# Patient Record
Sex: FEMALE | Race: WHITE | NOT HISPANIC OR LATINO | Employment: OTHER | RURAL
[De-identification: names, ages, dates, MRNs, and addresses within clinical notes are randomized per-mention and may not be internally consistent; named-entity substitution may affect disease eponyms.]

---

## 2022-12-09 ENCOUNTER — HOSPITAL ENCOUNTER (OUTPATIENT)
Dept: RADIOLOGY | Facility: HOSPITAL | Age: 67
Discharge: HOME | End: 2022-12-09
Attending: NURSE PRACTITIONER
Payer: MEDICARE

## 2022-12-09 DIAGNOSIS — M79.672 LEFT FOOT PAIN: ICD-10-CM

## 2022-12-09 PROCEDURE — 73630 X-RAY EXAM OF FOOT: CPT | Mod: TC,LT

## 2024-02-02 ENCOUNTER — HOSPITAL ENCOUNTER (EMERGENCY)
Facility: HOSPITAL | Age: 69
Discharge: HOME | End: 2024-02-02
Payer: MEDICARE

## 2024-02-02 VITALS
HEIGHT: 66 IN | DIASTOLIC BLOOD PRESSURE: 39 MMHG | WEIGHT: 147 LBS | HEART RATE: 78 BPM | RESPIRATION RATE: 18 BRPM | OXYGEN SATURATION: 97 % | BODY MASS INDEX: 23.63 KG/M2 | SYSTOLIC BLOOD PRESSURE: 123 MMHG | TEMPERATURE: 98 F

## 2024-02-02 DIAGNOSIS — M79.671 RIGHT FOOT PAIN: ICD-10-CM

## 2024-02-02 DIAGNOSIS — S99.921A INJURY OF RIGHT FOOT: Primary | ICD-10-CM

## 2024-02-02 PROCEDURE — 99283 EMERGENCY DEPT VISIT LOW MDM: CPT

## 2024-02-02 PROCEDURE — 99284 EMERGENCY DEPT VISIT MOD MDM: CPT | Mod: GF | Performed by: NURSE PRACTITIONER

## 2024-02-02 RX ORDER — PROPRANOLOL HYDROCHLORIDE 80 MG/1
80 CAPSULE, EXTENDED RELEASE ORAL
COMMUNITY
Start: 2024-01-02

## 2024-02-02 RX ORDER — ASPIRIN 325 MG
50000 TABLET, DELAYED RELEASE (ENTERIC COATED) ORAL
COMMUNITY
Start: 2024-01-09

## 2024-02-02 RX ORDER — OMEPRAZOLE 40 MG/1
40 CAPSULE, DELAYED RELEASE ORAL EVERY MORNING
COMMUNITY
Start: 2024-01-04

## 2024-02-02 RX ORDER — CLOPIDOGREL BISULFATE 75 MG/1
75 TABLET ORAL
COMMUNITY
Start: 2023-12-29

## 2024-02-02 RX ORDER — FAMOTIDINE 20 MG/1
20 TABLET, FILM COATED ORAL NIGHTLY PRN
COMMUNITY

## 2024-02-02 RX ORDER — ATORVASTATIN CALCIUM 40 MG/1
40 TABLET, FILM COATED ORAL NIGHTLY
COMMUNITY
Start: 2024-01-04

## 2024-02-02 RX ORDER — HYDROCODONE BITARTRATE AND ACETAMINOPHEN 10; 325 MG/1; MG/1
1 TABLET ORAL 2 TIMES DAILY PRN
COMMUNITY

## 2024-02-02 NOTE — ED PROVIDER NOTES
Encounter Date: 2/2/2024       History     Chief Complaint   Patient presents with    Foot Injury     right     67 y/o WF presents pov with c/o right foot pain after falling 6 days ago.Patient states she stooped forward and grabbed a rail. The rail gave way and patient fell hyperflexing her right forefoot. Patient rates pain 8/10. Refuses pain medication. States she goes to a pain clinic and took pain med just PTA to ED.        Review of patient's allergies indicates:  No Known Allergies  Past Medical History:   Diagnosis Date    GERD (gastroesophageal reflux disease)     HLD (hyperlipidemia)     Hypertension     Meniere's disease, unspecified ear      Past Surgical History:   Procedure Laterality Date    BACK SURGERY      HYSTERECTOMY      JOINT REPLACEMENT       History reviewed. No pertinent family history.  Social History     Tobacco Use    Smoking status: Every Day     Current packs/day: 1.00     Types: Cigarettes    Smokeless tobacco: Never   Substance Use Topics    Alcohol use: Never     Review of Systems   Constitutional:  Negative for chills and fever.   Respiratory: Negative.  Negative for apnea, cough, choking, chest tightness, shortness of breath, wheezing and stridor.    Cardiovascular: Negative.  Negative for chest pain, palpitations and leg swelling.   Musculoskeletal:  Positive for arthralgias.        Right forefoot pain   Skin: Negative.    Neurological: Negative.    Psychiatric/Behavioral: Negative.     All other systems reviewed and are negative.      Physical Exam     Initial Vitals [02/02/24 1449]   BP Pulse Resp Temp SpO2   (!) 123/39 78 18 97.9 °F (36.6 °C) 97 %      MAP       --         Physical Exam    Nursing note and vitals reviewed.  Constitutional: She appears well-developed and well-nourished. No distress.   Cardiovascular:  Normal rate, regular rhythm, normal heart sounds and intact distal pulses.     Exam reveals no gallop and no friction rub.       No murmur heard.  Pulmonary/Chest:  Breath sounds normal. No respiratory distress. She has no wheezes. She has no rhonchi. She has no rales. She exhibits no tenderness.   Musculoskeletal:      Right foot: Swelling, tenderness and bony tenderness present.        Legs:      Neurological: She is alert and oriented to person, place, and time. She has normal strength. No sensory deficit.   Skin: Skin is warm and dry. Capillary refill takes less than 2 seconds.   Psychiatric: She has a normal mood and affect. Her behavior is normal. Judgment and thought content normal.         Medical Screening Exam   See Full Note    ED Course   Procedures  Labs Reviewed - No data to display       Imaging Results              X-Ray Foot Complete Right (Final result)  Result time 02/02/24 15:28:08      Final result by Devon Cameron MD (02/02/24 15:28:08)                   Impression:      No acute findings.      Electronically signed by: Devon Cameron  Date:    02/02/2024  Time:    15:28               Narrative:    EXAMINATION:  XR FOOT COMPLETE 3 VIEW RIGHT    CLINICAL HISTORY:  . Unspecified injury of right foot, initial encounter    TECHNIQUE:  AP, lateral, and oblique views of the right foot were performed.    COMPARISON:  None    FINDINGS:  No acute fracture identified.  No dislocation.  Mild diffuse degenerative change.                                       Medications - No data to display  Medical Decision Making  67 y/o WF presents pov with c/o right foot pain after falling 6 days ago.Patient states she stooped forward and grabbed a rail. The rail gave way and patient fell hyperflexing her right forefoot. Patient rates pain 8/10. Refuses pain medication. States she goes to a pain clinic and took pain med just PTA to ED.      Amount and/or Complexity of Data Reviewed  Radiology: ordered.     Details: XR Right Foot: No acute findings.               ED Course as of 02/02/24 1547   Fri Feb 02, 2024   1533 XR Right Foot: No acute findings. [N[place]]      ED Course User  Index  [[place]] Martin Breen FNP                           Clinical Impression:   Final diagnoses:  [S99.921A] Injury of right foot (Primary)  [M79.671] Right foot pain        ED Disposition Condition    Discharge Stable          ED Prescriptions    None       Follow-up Information       Follow up With Specialties Details Why Contact Info    Anton Morgan NP Family Medicine Go to  As needed, for follow up [address] [place] [address] [place] [address]  [phone]               Martin Breen FNP  02/02/24 1538       Martin Breen FNP  02/02/24 1547

## 2024-05-28 ENCOUNTER — LAB VISIT (OUTPATIENT)
Dept: LAB | Facility: HOSPITAL | Age: 69
End: 2024-05-28
Attending: INTERNAL MEDICINE
Payer: MEDICARE

## 2024-05-28 DIAGNOSIS — R19.7 DIARRHEA, UNSPECIFIED TYPE: Primary | ICD-10-CM

## 2024-05-28 PROCEDURE — 87209 SMEAR COMPLEX STAIN: CPT

## 2024-05-28 PROCEDURE — 87427 SHIGA-LIKE TOXIN AG IA: CPT

## 2024-05-28 PROCEDURE — 89055 LEUKOCYTE ASSESSMENT FECAL: CPT

## 2024-05-28 PROCEDURE — 87045 FECES CULTURE AEROBIC BACT: CPT

## 2024-05-28 PROCEDURE — 87493 C DIFF AMPLIFIED PROBE: CPT

## 2024-05-28 PROCEDURE — 87046 STOOL CULTR AEROBIC BACT EA: CPT

## 2024-05-30 LAB — FECAL LEUKOCYTES: NORMAL /HPF

## 2024-05-31 LAB
CAMPYLOBACTER ANTIGEN: NEGATIVE
EHEC (SHIGA TOXIN 1): NEGATIVE
EHEC (SHIGA TOXIN 2): NEGATIVE

## 2024-06-01 LAB — SALM+SHIG+E COLI ETEC STL CULT: NORMAL

## 2024-06-02 LAB
O+P STL CONC: NORMAL
TRICHROME SMEAR: NORMAL

## 2024-06-06 LAB — C DIFF TOX A+B STL IA-ACNC: NEGATIVE

## 2024-11-18 ENCOUNTER — HOSPITAL ENCOUNTER (EMERGENCY)
Facility: HOSPITAL | Age: 69
Discharge: ANOTHER HOSPITAL | End: 2024-11-18
Payer: MEDICARE

## 2024-11-18 VITALS
HEART RATE: 77 BPM | WEIGHT: 145 LBS | DIASTOLIC BLOOD PRESSURE: 54 MMHG | TEMPERATURE: 98 F | OXYGEN SATURATION: 98 % | SYSTOLIC BLOOD PRESSURE: 101 MMHG | HEIGHT: 67 IN | RESPIRATION RATE: 19 BRPM | BODY MASS INDEX: 22.76 KG/M2

## 2024-11-18 DIAGNOSIS — N99.89: Primary | ICD-10-CM

## 2024-11-18 DIAGNOSIS — R44.3 HALLUCINATIONS: ICD-10-CM

## 2024-11-18 LAB
ALBUMIN SERPL BCP-MCNC: 2.4 G/DL (ref 3.4–4.8)
ALBUMIN/GLOB SERPL: 0.7 {RATIO}
ALP SERPL-CCNC: 144 U/L (ref 40–150)
ALT SERPL W P-5'-P-CCNC: <7 U/L (ref 0–55)
ANION GAP SERPL CALCULATED.3IONS-SCNC: 16 MMOL/L (ref 7–16)
AST SERPL W P-5'-P-CCNC: 37 U/L (ref 5–34)
BASOPHILS # BLD AUTO: 0.03 K/UL (ref 0–0.2)
BASOPHILS NFR BLD AUTO: 0.2 % (ref 0–1)
BILIRUB SERPL-MCNC: 1.8 MG/DL
BUN SERPL-MCNC: 25 MG/DL (ref 10–20)
BUN/CREAT SERPL: 22 (ref 6–20)
CALCIUM SERPL-MCNC: 9.2 MG/DL (ref 8.4–10.2)
CHLORIDE SERPL-SCNC: 97 MMOL/L (ref 98–107)
CO2 SERPL-SCNC: 25 MMOL/L (ref 23–31)
CREAT SERPL-MCNC: 1.16 MG/DL (ref 0.55–1.02)
DIFFERENTIAL METHOD BLD: ABNORMAL
EGFR (NO RACE VARIABLE) (RUSH/TITUS): 52 ML/MIN/1.73M2
EOSINOPHIL # BLD AUTO: 0.01 K/UL (ref 0–0.5)
EOSINOPHIL NFR BLD AUTO: 0.1 % (ref 1–4)
ERYTHROCYTE [DISTWIDTH] IN BLOOD BY AUTOMATED COUNT: 13.6 % (ref 11.5–14.5)
GLOBULIN SER-MCNC: 3.5 G/DL (ref 2–4)
GLUCOSE SERPL-MCNC: 119 MG/DL (ref 82–115)
HCT VFR BLD AUTO: 37.5 % (ref 38–47)
HGB BLD-MCNC: 12 G/DL (ref 12–16)
LYMPHOCYTES # BLD AUTO: 1.12 K/UL (ref 1–4.8)
LYMPHOCYTES NFR BLD AUTO: 7.9 % (ref 27–41)
MCH RBC QN AUTO: 31.2 PG (ref 27–31)
MCHC RBC AUTO-ENTMCNC: 32 G/DL (ref 32–36)
MCV RBC AUTO: 97.4 FL (ref 80–96)
MONOCYTES # BLD AUTO: 0.94 K/UL (ref 0–0.8)
MONOCYTES NFR BLD AUTO: 6.6 % (ref 2–6)
MPC BLD CALC-MCNC: 9.8 FL (ref 9.4–12.4)
NEUTROPHILS # BLD AUTO: 12.1 K/UL (ref 1.8–7.7)
NEUTROPHILS NFR BLD AUTO: 85.2 % (ref 53–65)
PLATELET # BLD AUTO: 139 K/UL (ref 150–400)
POTASSIUM SERPL-SCNC: 3.5 MMOL/L (ref 3.5–5.1)
PROT SERPL-MCNC: 5.9 G/DL (ref 5.8–7.6)
RBC # BLD AUTO: 3.85 M/UL (ref 4.2–5.4)
SODIUM SERPL-SCNC: 134 MMOL/L (ref 136–145)
WBC # BLD AUTO: 14.2 K/UL (ref 4.5–11)

## 2024-11-18 PROCEDURE — 99285 EMERGENCY DEPT VISIT HI MDM: CPT | Mod: 25

## 2024-11-18 PROCEDURE — 80053 COMPREHEN METABOLIC PANEL: CPT | Performed by: NURSE PRACTITIONER

## 2024-11-18 PROCEDURE — 85025 COMPLETE CBC W/AUTO DIFF WBC: CPT | Performed by: NURSE PRACTITIONER

## 2024-11-18 PROCEDURE — 63600175 PHARM REV CODE 636 W HCPCS: Performed by: NURSE PRACTITIONER

## 2024-11-18 PROCEDURE — 27000221 HC OXYGEN, UP TO 24 HOURS

## 2024-11-18 PROCEDURE — 25000003 PHARM REV CODE 250: Performed by: NURSE PRACTITIONER

## 2024-11-18 PROCEDURE — 99285 EMERGENCY DEPT VISIT HI MDM: CPT | Mod: EDII,,, | Performed by: NURSE PRACTITIONER

## 2024-11-18 PROCEDURE — 25500020 PHARM REV CODE 255: Performed by: NURSE PRACTITIONER

## 2024-11-18 PROCEDURE — 96361 HYDRATE IV INFUSION ADD-ON: CPT

## 2024-11-18 PROCEDURE — 96374 THER/PROPH/DIAG INJ IV PUSH: CPT

## 2024-11-18 PROCEDURE — 96375 TX/PRO/DX INJ NEW DRUG ADDON: CPT

## 2024-11-18 RX ORDER — CEPHALEXIN 500 MG/1
500 CAPSULE ORAL
COMMUNITY
Start: 2024-11-14 | End: 2024-11-21

## 2024-11-18 RX ORDER — CEFTRIAXONE 2 G/1
2 INJECTION, POWDER, FOR SOLUTION INTRAMUSCULAR; INTRAVENOUS
Status: COMPLETED | OUTPATIENT
Start: 2024-11-18 | End: 2024-11-18

## 2024-11-18 RX ORDER — HYDROCODONE BITARTRATE AND ACETAMINOPHEN 7.5; 325 MG/1; MG/1
1 TABLET ORAL
COMMUNITY
Start: 2024-11-14

## 2024-11-18 RX ORDER — ONDANSETRON HYDROCHLORIDE 2 MG/ML
4 INJECTION, SOLUTION INTRAVENOUS
Status: COMPLETED | OUTPATIENT
Start: 2024-11-18 | End: 2024-11-18

## 2024-11-18 RX ORDER — HYDROMORPHONE HYDROCHLORIDE 1 MG/ML
1 INJECTION, SOLUTION INTRAMUSCULAR; INTRAVENOUS; SUBCUTANEOUS
Status: COMPLETED | OUTPATIENT
Start: 2024-11-18 | End: 2024-11-18

## 2024-11-18 RX ORDER — IOPAMIDOL 755 MG/ML
100 INJECTION, SOLUTION INTRAVASCULAR
Status: COMPLETED | OUTPATIENT
Start: 2024-11-18 | End: 2024-11-18

## 2024-11-18 RX ORDER — TIOTROPIUM BROMIDE INHALATION SPRAY 3.12 UG/1
2 SPRAY, METERED RESPIRATORY (INHALATION)
COMMUNITY

## 2024-11-18 RX ADMIN — SODIUM CHLORIDE 1000 ML: 9 INJECTION, SOLUTION INTRAVENOUS at 04:11

## 2024-11-18 RX ADMIN — ONDANSETRON 4 MG: 2 INJECTION INTRAMUSCULAR; INTRAVENOUS at 01:11

## 2024-11-18 RX ADMIN — CEFTRIAXONE SODIUM 2 G: 2 INJECTION, POWDER, FOR SOLUTION INTRAMUSCULAR; INTRAVENOUS at 05:11

## 2024-11-18 RX ADMIN — HYDROMORPHONE HYDROCHLORIDE 1 MG: 1 INJECTION, SOLUTION INTRAMUSCULAR; INTRAVENOUS; SUBCUTANEOUS at 01:11

## 2024-11-18 RX ADMIN — IOPAMIDOL 100 ML: 755 INJECTION, SOLUTION INTRAVENOUS at 03:11

## 2024-11-18 NOTE — ED NOTES
"Pt asked "who are those little people on the wall?"   Pt reoriented to place and time. Re-assured their was no-one on the wall.   Pt states her  doesn't believe her but she knows her grandson is doing something with the flashing lights.     "

## 2024-11-18 NOTE — ED NOTES
Resting in bed. Denies needs at this time. Updated on wait, and plan of care. Will continue to monitor.

## 2024-11-18 NOTE — ED PROVIDER NOTES
"Encounter Date: 11/18/2024       History     Chief Complaint   Patient presents with    Hallucinations      states PTA patient was hallucinating seeing black and white lights and saying her grandson was making a mess in the house.  states the grandson was asleep in his room .     Pain     Pain since having surgery on Thursday, rates pain 10/10. " They gave me medicine and I already take some from pain management but I am in severe pain."   Pt has left nephrostomy tube secured with sutures and connected to rowe bag that is full of bright red blood. Pt states it has been like that since Thursday.      70 yo WF to ED with c/o generalized abdominal pain, abdominal distention since left percutaneous kidney stone removal on 11/14. Left nephrostomy tube noted with rowe containing bright red blood. No bleeding noted at incision site. Bandage in place. Spouse also states patient has been hallucinating, she states that it is not hallucinations, he just doesn't see it but grandson has a remote control toy in his room that is moving furniture around and making her see black and white lights. Patient was prescribed 7.5 mg Norco, she is also on 10 mg Norco per pain management. She states she has been alternating the doses and only taking 1/2 tab of each as needed but it is not helping with her pain. She is also on Keflex.     The history is provided by the patient and the spouse.     Review of patient's allergies indicates:   Allergen Reactions    Sulfa (sulfonamide antibiotics)      Past Medical History:   Diagnosis Date    GERD (gastroesophageal reflux disease)     HLD (hyperlipidemia)     Hypertension     Meniere's disease, unspecified ear      Past Surgical History:   Procedure Laterality Date    BACK SURGERY      HYSTERECTOMY      JOINT REPLACEMENT       No family history on file.  Social History     Tobacco Use    Smoking status: Every Day     Current packs/day: 1.00     Types: Cigarettes    Smokeless " tobacco: Never   Substance Use Topics    Alcohol use: Never    Drug use: Never     Review of Systems   Constitutional:  Positive for chills. Negative for fever.   Respiratory:  Negative for cough, shortness of breath and wheezing.    Cardiovascular:  Negative for chest pain, palpitations and leg swelling.   Gastrointestinal:  Positive for abdominal distention and abdominal pain. Negative for nausea and vomiting.   Genitourinary:  Positive for flank pain.   Musculoskeletal:  Negative for neck pain and neck stiffness.   Neurological:  Positive for weakness. Negative for dizziness and headaches.   Psychiatric/Behavioral:  Positive for hallucinations.    All other systems reviewed and are negative.      Physical Exam     Initial Vitals [11/18/24 1334]   BP Pulse Resp Temp SpO2   (!) 105/57 90 19 98.6 °F (37 °C) (!) 94 %      MAP       --         Physical Exam    Nursing note and vitals reviewed.  Constitutional: She appears well-developed and well-nourished.   HENT:   Head: Normocephalic and atraumatic.   Right Ear: External ear normal.   Left Ear: External ear normal.   Nose: Nose normal. Mouth/Throat: Oropharynx is clear and moist.   Eyes: EOM are normal. Pupils are equal, round, and reactive to light.   Neck: Neck supple.   Normal range of motion.  Cardiovascular:  Normal rate, regular rhythm and normal heart sounds.           Pulmonary/Chest: Breath sounds normal.   Abdominal: Abdomen is soft and protuberant. Bowel sounds are normal. She exhibits distension. There is abdominal tenderness.   Musculoskeletal:         General: Normal range of motion.      Cervical back: Normal range of motion and neck supple.     Neurological: She is alert and oriented to person, place, and time. She has normal strength. GCS score is 15. GCS eye subscore is 4. GCS verbal subscore is 5. GCS motor subscore is 6.   Skin: Skin is warm. Capillary refill takes less than 2 seconds.        Psychiatric: She has a normal mood and affect. Her  behavior is normal. Judgment and thought content normal. She is actively hallucinating.         Medical Screening Exam   See Full Note    ED Course   Procedures  Labs Reviewed   COMPREHENSIVE METABOLIC PANEL - Abnormal       Result Value    Sodium 134 (*)     Potassium 3.5      Chloride 97 (*)     CO2 25      Anion Gap 16      Glucose 119 (*)     BUN 25 (*)     Creatinine 1.16 (*)     BUN/Creatinine Ratio 22 (*)     Calcium 9.2      Total Protein 5.9      Albumin 2.4 (*)     Globulin 3.5      A/G Ratio 0.7      Bilirubin, Total 1.8 (*)     Alk Phos 144      ALT <7      AST 37 (*)     eGFR 52 (*)    CBC WITH DIFFERENTIAL - Abnormal    WBC 14.20 (*)     RBC 3.85 (*)     Hemoglobin 12.0      Hematocrit 37.5 (*)     MCV 97.4 (*)     MCH 31.2 (*)     MCHC 32.0      RDW 13.6      Platelet Count 139 (*)     MPV 9.8      Neutrophils % 85.2 (*)     Lymphocytes % 7.9 (*)     Neutrophils, Abs 12.10 (*)     Lymphocytes, Absolute 1.12      Diff Type Auto      Monocytes % 6.6 (*)     Eosinophils % 0.1 (*)     Basophils % 0.2      Monocytes, Absolute 0.94 (*)     Eosinophils, Absolute 0.01      Basophils, Absolute 0.03     CULTURE, BLOOD   CULTURE, BLOOD   CBC W/ AUTO DIFFERENTIAL    Narrative:     The following orders were created for panel order CBC auto differential.  Procedure                               Abnormality         Status                     ---------                               -----------         ------                     CBC with Differential[[id number]]       Abnormal            Final result               Manual Differential[[id number]]                                                          Please view results for these tests on the individual orders.   URINALYSIS, REFLEX TO URINE CULTURE          Imaging Results               CT Abdomen Pelvis With IV Contrast NO Oral Contrast (Final result)  Result time 11/18/24 16:26:34      Final result by Rd Granados MD (11/18/24 16:26:34)                    Impression:      1. There is the percutaneous catheter entering posteriorly on the left. The tip is not within the collecting system and is located adjacent to the lower pole of the left kidney.  Recommend clinical correlation.  There is an ill-defined perinephric fluid collection anteriorly adjacent to the renal pelvis extending to the left anterior perirenal space also.  Few foci of air are noted adjacent to the renal pelvis could be a postop change or related to a perforation of the renal pelvis. Early abscess formation is a consideration and follow-up is recommended.  There is moderate hydronephrosis and hydroureter on the left. Mild wall thickening the renal pelvis and left ureter could be a postop change. Inflammatory or infectious process is not excluded.  Follow-up recommended.  2. Hepatic steatosis.  3. Splenomegaly.  4. Atherosclerotic changes of the aorta with mild ectasia and possible high-grade narrowing of the infrarenal abdominal aorta with prominent calcific atherosclerosis.  See above comments.  Follow-up recommended.  5. Mild wall thickening and stranding of the anterior urinary bladder wall may be associated with cystitis.  See above comments.  Follow-up recommended.  6. This report was flagged in Epic as abnormal.      Electronically signed by: Rd Huff  Date:    11/18/2024  Time:    16:26               Narrative:    EXAMINATION:  CT ABDOMEN PELVIS WITH IV CONTRAST    CLINICAL HISTORY:  Abdominal pain, post-op;    TECHNIQUE:  Low dose axial images, sagittal and coronal reformations were obtained from the lung bases to the pubic symphysis following the IV administration of 100 mL of Omnipaque 350 .  Oral contrast was not administered.    COMPARISON:  06/10/2016    FINDINGS:  Abdomen:    - Lower thorax:    - Lung bases: No infiltrates and no nodules.    - Liver: Liver is normal size.  Mild diffuse fatty infiltration.  No focal abnormality.    - Gallbladder: No calcified gallstones.    - Bile  Ducts: No evidence of intra or extra hepatic biliary ductal dilation.    - Spleen: Spleen is mildly enlarged no focal abnormality.    - Kidneys: Small cyst at the upper pole the right kidney.  No stone, mass, hydronephrosis or hydroureter on the right.    There is the percutaneous catheter entering posteriorly on the left.  The tip is not within the collecting system and is located adjacent to the lower pole of the left kidney.  Recommend clinical correlation.    There is an ill-defined perinephric fluid collection anteriorly adjacent to the renal pelvis extending to the left anterior perirenal space also.  Few foci of air are noted adjacent to the renal pelvis could be a postop change or related to a perforation of the renal pelvis.  Early abscess formation is a consideration and follow-up is recommended.    There is moderate hydronephrosis and hydroureter on the left.  Mild wall thickening the renal pelvis and left ureter could be a postop change.  Inflammatory or infectious process is not excluded.  Follow-up recommended.    - Adrenals: Unremarkable.    - Pancreas: No mass or peripancreatic fat stranding.    - Retroperitoneum:  No significant adenopathy.    - Vascular: Aortic atherosclerosis and mild ectasia maximum diameter 2.4 cm.  Possible high-grade narrowing of the infrarenal abdominal aorta with prominent calcific atherosclerosis near axial 33 of series 3.    - Abdominal wall:  Unremarkable.    Pelvis:    Mild wall thickening and stranding of the anterior urinary bladder wall may be associated with cystitis.  Minimal air in the urinary bladder may be associated with recent instrumentation.    Bowel/Mesentery:    No evidence of bowel obstruction or inflammation.    Bones:  No acute osseous abnormality and no suspicious lytic or blastic lesion.                                       Medications   sodium chloride 0.9% bolus 1,000 mL 1,000 mL (1,000 mLs Intravenous New Bag 11/18/24 8627)   cefTRIAXone injection  2 g (has no administration in time range)   HYDROmorphone injection 1 mg (1 mg Intravenous Given 11/18/24 1355)   ondansetron injection 4 mg (4 mg Intravenous Given 11/18/24 1354)   iopamidoL (ISOVUE-370) injection 100 mL (100 mLs Intravenous Given 11/18/24 1505)     Medical Decision Making  68 yo WF to ED with c/o generalized abdominal pain, abdominal distention, subjective fever since left percutaneous kidney stone removal on 11/14. Left nephrostomy tube noted with rowe containing bright red blood. No bleeding noted at incision site. Bandage in place. Spouse also states patient has been hallucinating, she states that it is not hallucinations, he just doesn't see it but grandson has a remote control toy in his room that is moving furniture around and making her see black and white lights. Patient was prescribed 7.5 mg Norco, she is also on 10 mg Norco per pain management. She states she has been alternating the doses and only taking 1/2 tab of each as needed but it is not helping with her pain. She is also on Keflex.   She reports calling urology clinic yesterday and today but they were not concerned and did not want to see her earlier. She has follow up appt on Thursday.     The history is provided by the patient and the spouse.     Vitals reviewed  Labs reviewed    CT A/P: 1. There is the percutaneous catheter entering posteriorly on the left. The tip is not within the collecting system and is located adjacent to the lower pole of the left kidney.  Recommend clinical correlation.  There is an ill-defined perinephric fluid collection anteriorly adjacent to the renal pelvis extending to the left anterior perirenal space also.  Few foci of air are noted adjacent to the renal pelvis could be a postop change or related to a perforation of the renal pelvis. Early abscess formation is a consideration and follow-up is recommended.  There is moderate hydronephrosis and hydroureter on the left. Mild wall thickening the  renal pelvis and left ureter could be a postop change. Inflammatory or infectious process is not excluded.  Follow-up recommended.  2. Hepatic steatosis.  3. Splenomegaly.  4. Atherosclerotic changes of the aorta with mild ectasia and possible high-grade narrowing of the infrarenal abdominal aorta with prominent calcific atherosclerosis.  See above comments.  Follow-up recommended.  5. Mild wall thickening and stranding of the anterior urinary bladder wall may be associated with cystitis.  See above comments.  Follow-up recommended.        Amount and/or Complexity of Data Reviewed  Labs: ordered. Decision-making details documented in ED Course.  Radiology: ordered. Decision-making details documented in ED Course.    Risk  Prescription drug management.               ED Course as of 11/18/24 1730   Mon Nov 18, 2024   1715 Spoke to Dr. Guevara, urology, accepted patient to [facility]. Will be ER to ER transfer so [facility]FC getting acceptance from ED provider. [MJ]   1723 Patient accepted to [facility] ED per Dr. Judd. Patient and family agreeable to plan of care and disposition.  [MJ]      ED Course User Index  [MJ] Judy Darling FNP                           Clinical Impression:   Final diagnoses:  [N99.89] Postoperative surgical complication involving genitourinary system associated with non-genitourinary procedure, unspecified complication (Primary)  [R44.3] Hallucinations        ED Disposition Condition    Transfer to Another Facility Stable                           Judy Darling FNP  11/18/24 1731

## 2024-11-19 NOTE — ED NOTES
Pt requesting to go outside to smoke, explained to patient that she is hypotensive, on home O2, and we are a non-smoking facility. NP offered to order a nicotine patch for patient, but she declined.

## 2024-11-19 NOTE — ED NOTES
Pt and  updated on Plan of Care. Currently waiting for EMS to pickup for transport to [facility] ED. Both frustrated with waiting but both verbalize understanding. Denies needs at this time.

## 2024-11-19 NOTE — ED NOTES
Patient discharged to EMS for transfer to [facility] in [facility]

## 2024-11-23 LAB
BACTERIA BLD CULT: NORMAL
BACTERIA BLD CULT: NORMAL

## 2024-12-18 DIAGNOSIS — S72.141A: Primary | ICD-10-CM

## 2024-12-20 ENCOUNTER — CLINICAL SUPPORT (OUTPATIENT)
Dept: REHABILITATION | Facility: HOSPITAL | Age: 69
End: 2024-12-20
Payer: MEDICARE

## 2024-12-20 DIAGNOSIS — S72.141A: ICD-10-CM

## 2024-12-20 DIAGNOSIS — S72.141A CLOSED DISPLACED INTERTROCHANTERIC FRACTURE OF RIGHT FEMUR, INITIAL ENCOUNTER: Primary | ICD-10-CM

## 2024-12-20 PROCEDURE — 97162 PT EVAL MOD COMPLEX 30 MIN: CPT

## 2024-12-20 NOTE — PLAN OF CARE
[facility]   Physical Therapy Initial Evaluation      Name: Emily Ya  Clinic Number: [id number]    Therapy Diagnosis:   Encounter Diagnosis   Name Primary?    Displaced intertrochanteric fracture of right femur         Physician: Sukh Lei II*    Physician Orders: PT Eval and Treat   Medical Diagnosis from Referral: S72.141A (ICD-10-CM) - Displaced intertrochanteric fracture of right femur  Evaluation Date: 12/20/2024  Plan of Care Expiration: 1/17/2025  Progress Note Due: 1/17/2025  Date of Surgery: 12/13/2024  Visit # / Visits authorized: 1/ 12   FOTO: 26    Precautions: Standard     Time In: 1:15 PM  Time Out: 2:00 PM  Total Billable Time: 45 minutes    Subjective         History of current condition - Emily reports: endured a fall and underwent right femur nailing on 12/13/24. She states she fell tripping over her dog at home. Prior to this she was independent with all forms of functional mobility.    Falls: Yes (repeated falls)    Imaging: none:     Prior Therapy: None  Social History:  lives with their family  Occupation: Retried  Prior Level of Function: Independent occasionally used a cane    Pain:  Current 8/10, worst 9/10, best 4/10   Location: right hip    Description: Aching  Aggravating Factors: Standing and Walking  Easing Factors: pain medication    Patients goals: Pain relief and return to PLOF     Medical History:   Past Medical History:   Diagnosis Date    GERD (gastroesophageal reflux disease)     HLD (hyperlipidemia)     Hypertension     Meniere's disease, unspecified ear        Surgical History:   Emily Ya  has a past surgical history that includes Joint replacement; Back surgery; and Hysterectomy.    Medications:   Emily has a current medication list which includes the following prescription(s): atorvastatin, cholecalciferol (vitamin d3), clopidogrel, famotidine, hydrocodone-acetaminophen, hydrocodone-acetaminophen, omeprazole,  propranolol, and spiriva respimat.    Allergies:   Review of patient's allergies indicates:   Allergen Reactions    Sulfa (sulfonamide antibiotics)         Objective      Incisions: Dry and Intact        Range of motion:  Motion Right Left    Hip flexion   105  WITHIN FUNCTIONAL LIMITS   Hip extension   2  WITHIN FUNCTIONAL LIMITS   Hip abduction   12   25   Hip adduction   Not tested   Not tested   Internal rotation   10   24   External rotation   10   28   Knee extension  WITHIN FUNCTIONAL LIMITS  WITHIN FUNCTIONAL LIMITS   Knee flexion  WITHIN FUNCTIONAL LIMITS  WITHIN FUNCTIONAL LIMITS   Ankle DF  WITHIN FUNCTIONAL LIMITS  WITHIN FUNCTIONAL LIMITS   Ankle PF  WITHIN FUNCTIONAL LIMITS  WITHIN FUNCTIONAL LIMITS   Ankle Inversion  WITHIN FUNCTIONAL LIMITS  WITHIN FUNCTIONAL LIMITS   Ankle Eversion  WITHIN FUNCTIONAL LIMITS  WITHIN FUNCTIONAL LIMITS       Manual muscle test   Muscle Right  Left    Hip flexion  MMT strength: 3-/5  MMT strength: 4+/5   Hip extension  MMT strength: 2/5  MMT strength: 4/5   Hip abduction  MMT strength: 2+/5  MMT strength: 4+/5   Hip adduction  MMT strength: 2-/5  MMT strength: 4/5   Hip internal rotation  MMT strength: 2+/5  MMT strength: 4+/5   Hip external rotation  MMT strength: 2+/5  MMT strength: 4+/5   Knee extension  MMT strength: 3+/5  MMT strength: 4+/5   Knee flexion  MMT strength: 3+/5  MMT strength: 4+/5   Ankle DF  MMT strength: 4-/5  MMT strength: 4+/5   Ankle PF  MMT strength: 4-/5  MMT strength: 4+/5   Ankle inversion  MMT strength: 4-/5  MMT strength: 4+/5   Ankle eversion  MMT strength: 4+/5  MMT strength: 4+/5       Gait:  Weight bearing precautions: WBAT  Assistive device: rolling walker  Ambulation distance and deviations:  Stairs:  Comments:        Patient Education and Home Exercises     Education provided:   - on evaluation results and overall POC    Written Home Exercises Provided: Yes. Exercises were reviewed and Emily was able to demonstrate them prior to  the end of the session.  Emily demonstrated good  understanding of the education provided. See EMR under Patient Instructions for exercises provided during therapy sessions.    Assessment     Emily is a 69 y.o. female referred to outpatient Physical Therapy with a medical diagnosis of Right femur fracture. Patient presents with reduced activity tolerance due to pain, difficulty with ambulation due to reduced LE strength and pain free range of motion, limited flexibility, difficulty negotiating ramps and steps.    Patient prognosis is Excellent.   Patient will benefit from skilled outpatient Physical Therapy to address the deficits stated above and in the chart below, provide patient /family education, and to maximize patientt's level of independence.     Plan of care discussed with patient: Yes  Patient's spiritual, cultural and educational needs considered and patient is agreeable to the plan of care and goals as stated below:     Anticipated Barriers for therapy: None        Goals:   4 weeks   1. Independent with Home Exercise Program   2. Increase right Hip Range of Motion to 25 degrees for abduction and flexion to 110 AROM  3. Increase Right Hip Strength to grossly 4+/5  4. Patient will ambulate 500 feet with fluid step through gait pattern using front wheeled walker    Goals will be updated at time of UP  Plan     Plan of care Certification: 12/20/2024 to 1/17/2025.    Outpatient Physical Therapy 3 times weekly for 4 weeks to include the following interventions: Electrical Stimulation IFC, Gait Training, Manual Therapy, Moist Heat/ Ice, Neuromuscular Re-ed, Patient Education, and Therapeutic Activities, Therapeutic Exercises    Kamaljit Duncan PT        Physician's Signature: _________________________________________ Date: ________________

## 2024-12-23 ENCOUNTER — CLINICAL SUPPORT (OUTPATIENT)
Dept: REHABILITATION | Facility: HOSPITAL | Age: 69
End: 2024-12-23
Payer: MEDICARE

## 2024-12-23 DIAGNOSIS — S72.8X1A OTHER FRACTURE OF RIGHT FEMUR, INITIAL ENCOUNTER FOR CLOSED FRACTURE: Primary | ICD-10-CM

## 2024-12-23 PROCEDURE — 97110 THERAPEUTIC EXERCISES: CPT | Mod: CQ

## 2024-12-23 PROCEDURE — 97530 THERAPEUTIC ACTIVITIES: CPT | Mod: CQ

## 2024-12-23 PROCEDURE — 97010 HOT OR COLD PACKS THERAPY: CPT | Mod: CQ

## 2024-12-23 NOTE — PROGRESS NOTES
[facility]   Physical Therapy Treatment Note      Name: Emily Ya  Clinic Number: [id number]    Therapy Diagnosis:   Encounter Diagnosis   Name Primary?    Other fracture of right femur, initial encounter for closed fracture Yes     Physician: Sukh Lei II*    Visit Date: 12/23/2024    Physician Orders: PT Eval and Treat   Medical Diagnosis from Referral: S72.141A (ICD-10-CM) - Displaced intertrochanteric fracture of right femur  Evaluation Date: 12/20/2024  Plan of Care Expiration: 1/17/2025  Progress Note Due: 1/17/2025  Date of Surgery: 12/13/2024  Visit # / Visits authorized: 2/ 12   FOTO: 1/3    Start Time: 1306  End Time: 1348  Total Time: 42 minutes  PTA Visit #: 1/5       Subjective     Patient reports: Pt reports that she is hurting pretty good today, she did take half of a pain pill before.  She was compliant with home exercise program.  Response to previous treatment: good  Functional change: none    Pain: 8/10  Location: right upper legs     Objective      Objective Measures updated at progress report unless specified.     Treatment     Emily received the treatments listed below:      therapeutic exercises to develop strength and endurance for 17 minutes including:  Nustep 4 minutes  Seated LAQs with 1# weight on left ankle 3 x 10  Seated marches with 1# weight on left ankle 3 x 10  Seated heel-toe raises x 30  Seated hip add with yellow ball  3 x 10    Neuromuscular re-education activities to improve: Balance for 0 minutes. The following activities were included:      therapeutic activities to improve functional performance for 15  minutes, including:  Sit to stand x 5  Standing marches on right x 10  Standing hip adb on right x 10  Standing hip extension on right x 10    gait training to improve functional mobility and safety for 0  minutes, including:    cold pack for 10 minutes to right hip.    Patient Education and Home Exercises       Education  provided:   - continue POC    Written Home Exercises Provided: Yes. Exercises were reviewed and Emily was able to demonstrate them prior to the end of the session.  Emily demonstrated good  understanding of the education provided. See Electronic Medical Record under Patient Instructions for exercises provided during therapy sessions    Assessment     Pt tolerated tx well. Pt was able to handle added exercises.  Emily Is progressing well towards her goals.   Patient prognosis is Good.     Patient will continue to benefit from skilled outpatient physical therapy to address the deficits listed in the problem list box on initial evaluation, provide pt/family education and to maximize pt's level of independence in the home and community environment.     Patient's spiritual, cultural and educational needs considered and pt agreeable to plan of care and goals.     Anticipated barriers to physical therapy: none    Goals:   4 weeks   1. Independent with Home Exercise Program   2. Increase right Hip Range of Motion to 25 degrees for abduction and flexion to 110 AROM  3. Increase Right Hip Strength to grossly 4+/5  4. Patient will ambulate 500 feet with fluid step through gait pattern using front wheeled walker     Goals will be updated at time of UPOC    Plan     Continue with POC    Indira Kessler PTA

## 2024-12-26 ENCOUNTER — CLINICAL SUPPORT (OUTPATIENT)
Dept: REHABILITATION | Facility: HOSPITAL | Age: 69
End: 2024-12-26
Payer: MEDICARE

## 2024-12-26 DIAGNOSIS — S72.8X1A OTHER FRACTURE OF RIGHT FEMUR, INITIAL ENCOUNTER FOR CLOSED FRACTURE: Primary | ICD-10-CM

## 2024-12-26 PROCEDURE — 97530 THERAPEUTIC ACTIVITIES: CPT | Mod: CQ

## 2024-12-26 PROCEDURE — 97110 THERAPEUTIC EXERCISES: CPT | Mod: CQ

## 2024-12-26 NOTE — PROGRESS NOTES
[facility]   Physical Therapy Treatment Note      Name: Emily Ya  Clinic Number: [id number]    Therapy Diagnosis:   Encounter Diagnosis   Name Primary?    Other fracture of right femur, initial encounter for closed fracture Yes     Physician: Sukh Lei II*    Visit Date: 12/26/2024    Physician Orders: PT Eval and Treat   Medical Diagnosis from Referral: S72.141A (ICD-10-CM) - Displaced intertrochanteric fracture of right femur  Evaluation Date: 12/20/2024  Plan of Care Expiration: 1/17/2025  Progress Note Due: 1/17/2025  Date of Surgery: 12/13/2024  Visit # / Visits authorized: 3/ 12   FOTO: 1/3    Start Time: 1358  End Time: 1433  Total Time: 31 minutes  PTA Visit #: 2/5       Subjective     Patient reports: Pt reports that she is not hurting as bad today.  She was compliant with home exercise program.  Response to previous treatment: good  Functional change: none    Pain: 7/10  Location: right upper legs     Objective      Objective Measures updated at progress report unless specified.     Treatment     Emily received the treatments listed below:      therapeutic exercises to develop strength and endurance for 13 minutes including:  Nustep x 6 minutes  Seated LAQs with 1# weight on left ankle 3 x 10  Seated marches with 1# weight on left ankle 3 x 10  Seated heel-toe raises x 30  Seated hip add with yellow ball  3 x 10    Neuromuscular re-education activities to improve: Balance for 0 minutes. The following activities were included:      therapeutic activities to improve functional performance for 8 minutes, including:  Mini squats x 10  Standing marches on right 2 x 10  Standing hip adb on right  2 x 10  Standing hip extension on right 2 x 10    gait training to improve functional mobility and safety for 0  minutes, including:    cold pack for 10 minutes to right hip.    Patient Education and Home Exercises       Education provided:   - continue POC    Written  Home Exercises Provided: Yes. Exercises were reviewed and Emily was able to demonstrate them prior to the end of the session.  Emily demonstrated good  understanding of the education provided. See Electronic Medical Record under Patient Instructions for exercises provided during therapy sessions    Assessment     Pt was able to tolerate increase in exercises well.    Emily Is progressing well towards her goals.   Patient prognosis is Good.     Patient will continue to benefit from skilled outpatient physical therapy to address the deficits listed in the problem list box on initial evaluation, provide pt/family education and to maximize pt's level of independence in the home and community environment.     Patient's spiritual, cultural and educational needs considered and pt agreeable to plan of care and goals.     Anticipated barriers to physical therapy: none    Goals:   4 weeks   1. Independent with Home Exercise Program   2. Increase right Hip Range of Motion to 25 degrees for abduction and flexion to 110 AROM  3. Increase Right Hip Strength to grossly 4+/5  4. Patient will ambulate 500 feet with fluid step through gait pattern using front wheeled walker     Goals will be updated at time of UPOC    Plan     Continue with MCKINLEY Kessler PTA

## 2025-01-06 ENCOUNTER — CLINICAL SUPPORT (OUTPATIENT)
Dept: REHABILITATION | Facility: HOSPITAL | Age: 70
End: 2025-01-06
Payer: MEDICARE

## 2025-01-06 DIAGNOSIS — S72.8X1A OTHER FRACTURE OF RIGHT FEMUR, INITIAL ENCOUNTER FOR CLOSED FRACTURE: Primary | ICD-10-CM

## 2025-01-06 PROCEDURE — 97530 THERAPEUTIC ACTIVITIES: CPT | Mod: CQ

## 2025-01-06 PROCEDURE — 97010 HOT OR COLD PACKS THERAPY: CPT | Mod: CQ

## 2025-01-06 PROCEDURE — 97110 THERAPEUTIC EXERCISES: CPT | Mod: CQ

## 2025-01-06 NOTE — PROGRESS NOTES
[facility]   Physical Therapy Treatment Note      Name: Emily Ya  Clinic Number: [id number]    Therapy Diagnosis:   Encounter Diagnosis   Name Primary?    Other fracture of right femur, initial encounter for closed fracture Yes     Physician: Sukh Lei II*    Visit Date: 1/6/2025    Physician Orders: PT Eval and Treat   Medical Diagnosis from Referral: S72.141A (ICD-10-CM) - Displaced intertrochanteric fracture of right femur  Evaluation Date: 12/20/2024  Plan of Care Expiration: 1/17/2025  Progress Note Due: 1/17/2025  Date of Surgery: 12/13/2024  Visit # / Visits authorized: 4/ 12   FOTO: 1/3    Start Time: 1310  End Time: 1348  Total Time: 38 minutes   PTA Visit #: 3/5       Subjective     Patient reports: Pt reports that she is hurting pretty bad   She was compliant with home exercise program.  Response to previous treatment: good  Functional change: none    Pain: 7/10  Location: right upper legs     Objective      Objective Measures updated at progress report unless specified.     Treatment     Emily received the treatments listed below:      therapeutic exercises to develop strength and endurance for 15 minutes including:  Nustep x 6 minutes  Seated LAQs with 1# weight on left ankle 3 x 10  Seated marches with 1# weight on left ankle 2 x 10  Seated heel-toe raises x 30  Seated hip add with yellow ball  2 x 10    Neuromuscular re-education activities to improve: Balance for 0 minutes. The following activities were included:      therapeutic activities to improve functional performance for 13 minutes, including:  Standing marches on right 2 x 10  Standing hip adb on right  2 x 10  Standing hip extension on right 2 x 10    gait training to improve functional mobility and safety for 0  minutes, including:    cold pack for 10 minutes to right hip.    Patient Education and Home Exercises       Education provided:   - continue POC    Written Home Exercises Provided:  Yes. Exercises were reviewed and Emily was able to demonstrate them prior to the end of the session.  Emily demonstrated good  understanding of the education provided. See Electronic Medical Record under Patient Instructions for exercises provided during therapy sessions    Assessment     Pt was able to tolerate increase in exercises well.    Emily Is progressing well towards her goals.   Patient prognosis is Good.     Patient will continue to benefit from skilled outpatient physical therapy to address the deficits listed in the problem list box on initial evaluation, provide pt/family education and to maximize pt's level of independence in the home and community environment.     Patient's spiritual, cultural and educational needs considered and pt agreeable to plan of care and goals.     Anticipated barriers to physical therapy: none    Goals:   4 weeks   1. Independent with Home Exercise Program   2. Increase right Hip Range of Motion to 25 degrees for abduction and flexion to 110 AROM  3. Increase Right Hip Strength to grossly 4+/5  4. Patient will ambulate 500 feet with fluid step through gait pattern using front wheeled walker     Goals will be updated at time of UPOC    Plan     Continue with MCKINLEY Kessler PTA

## 2025-01-08 ENCOUNTER — CLINICAL SUPPORT (OUTPATIENT)
Dept: REHABILITATION | Facility: HOSPITAL | Age: 70
End: 2025-01-08
Payer: MEDICARE

## 2025-01-08 DIAGNOSIS — S72.8X1A OTHER FRACTURE OF RIGHT FEMUR, INITIAL ENCOUNTER FOR CLOSED FRACTURE: Primary | ICD-10-CM

## 2025-01-08 PROCEDURE — 97110 THERAPEUTIC EXERCISES: CPT | Mod: CQ

## 2025-01-08 PROCEDURE — 97530 THERAPEUTIC ACTIVITIES: CPT | Mod: CQ

## 2025-01-08 PROCEDURE — 97010 HOT OR COLD PACKS THERAPY: CPT | Mod: CQ

## 2025-01-13 ENCOUNTER — CLINICAL SUPPORT (OUTPATIENT)
Dept: REHABILITATION | Facility: HOSPITAL | Age: 70
End: 2025-01-13
Payer: MEDICARE

## 2025-01-13 DIAGNOSIS — S72.141A CLOSED DISPLACED INTERTROCHANTERIC FRACTURE OF RIGHT FEMUR, INITIAL ENCOUNTER: Primary | ICD-10-CM

## 2025-01-13 PROCEDURE — 97110 THERAPEUTIC EXERCISES: CPT

## 2025-01-13 NOTE — PROGRESS NOTES
[facility]   Physical Therapy Treatment Note      Name: Emily Ya  Clinic Number: [id number]    Therapy Diagnosis:   Encounter Diagnosis   Name Primary?    Closed displaced intertrochanteric fracture of right femur, initial encounter Yes     Physician: Sukh Lei    Visit Date: 1/13/2025    Physician Orders: PT Eval and Treat   Medical Diagnosis from Referral: S72.141A (ICD-10-CM) - Displaced intertrochanteric fracture of right femur  Evaluation Date: 12/20/2024  Plan of Care Expiration: 1/17/2025  Progress Note Due: 1/17/2025  Date of Surgery: 12/13/2024  Visit # / Visits authorized: 6/ 12   FOTO: 26    Start Time: 1:10 PM  End Time: 1:55 PM  Total Time: 45 minutes  PTA Visit #: 0/5       Subjective     Patient reports: Pt reports that she is hurting today.  She was compliant with home exercise program.  Response to previous treatment: good  Functional change: none    Pain: 5/10  Location: right upper legs     Objective      Objective Measures updated at progress report unless specified.     Treatment     Emily received the treatments listed below:      therapeutic exercises to develop strength and endurance for 15 minutes including:  Nustep x 6 minutes  Seated LAQs with 1# weight on right ankle 2 x 10 5#  Seated HS Curls x30 GTB  Seated heel-toe raises x 30  Heel Cord Stretch 4x30 secs  Bridges x30     Neuromuscular re-education activities to improve: Balance for 0 minutes. The following activities were included:      therapeutic activities to improve functional performance for 13 minutes, including:  Standing marches on right 2 x 10  Standing hip adb on right  2 x 10 5#  Standing hip extension on right 2 x 10 5#  Step Ups (placing right foot on step) 5# x30 secs    gait training to improve functional mobility and safety for 0  minutes, including:    cold pack for 10 minutes to right hip.    Patient Education and Home Exercises       Education provided:   -  continue POC    Written Home Exercises Provided: Yes. Exercises were reviewed and Emily was able to demonstrate them prior to the end of the session.  Emiyl demonstrated good  understanding of the education provided. See Electronic Medical Record under Patient Instructions for exercises provided during therapy sessions    Assessment     Pt was able to tolerate increase in exercises well.    Emily Is progressing well towards her goals.   Patient prognosis is Good.     Patient will continue to benefit from skilled outpatient physical therapy to address the deficits listed in the problem list box on initial evaluation, provide pt/family education and to maximize pt's level of independence in the home and community environment.     Patient's spiritual, cultural and educational needs considered and pt agreeable to plan of care and goals.     Anticipated barriers to physical therapy: none    Goals:   4 weeks   1. Independent with Home Exercise Program   2. Increase right Hip Range of Motion to 25 degrees for abduction and flexion to 110 AROM  3. Increase Right Hip Strength to grossly 4+/5  4. Patient will ambulate 500 feet with fluid step through gait pattern using front wheeled walker     Goals will be updated at time of UPOC    Plan     Continue with POC    Kamaljit Duncan, PT

## 2025-01-15 ENCOUNTER — CLINICAL SUPPORT (OUTPATIENT)
Dept: REHABILITATION | Facility: HOSPITAL | Age: 70
End: 2025-01-15
Payer: MEDICARE

## 2025-01-15 DIAGNOSIS — S72.8X1A OTHER FRACTURE OF RIGHT FEMUR, INITIAL ENCOUNTER FOR CLOSED FRACTURE: Primary | ICD-10-CM

## 2025-01-15 PROCEDURE — 97010 HOT OR COLD PACKS THERAPY: CPT | Mod: CQ

## 2025-01-15 PROCEDURE — 97110 THERAPEUTIC EXERCISES: CPT | Mod: CQ

## 2025-01-15 PROCEDURE — 97530 THERAPEUTIC ACTIVITIES: CPT | Mod: CQ

## 2025-01-15 NOTE — PROGRESS NOTES
[facility]   Physical Therapy Treatment Note      Name: Emily Ya  Clinic Number: [id number]    Therapy Diagnosis:   Encounter Diagnosis   Name Primary?    Other fracture of right femur, initial encounter for closed fracture Yes     Physician: Sukh Lei II*    Visit Date: 1/15/2025    Physician Orders: PT Eval and Treat   Medical Diagnosis from Referral: S72.141A (ICD-10-CM) - Displaced intertrochanteric fracture of right femur  Evaluation Date: 12/20/2024  Plan of Care Expiration: 1/17/2025  Progress Note Due: 1/17/2025  Date of Surgery: 12/13/2024  Visit # / Visits authorized: 7/ 12   FOTO: 26    Start Time: 1308  End Time: 1346  Total Time: 38 minutes  PTA Visit #: 1/5       Subjective     Patient reports: Pt reports that she is hurting a little bit worse today  She was compliant with home exercise program.  Response to previous treatment: good  Functional change: none    Pain: 7/10  Location: right upper legs     Objective      Objective Measures updated at progress report unless specified.     Treatment     Emily received the treatments listed below:      therapeutic exercises to develop strength and endurance for 15 minutes including:  Nustep x 6 minutes  Seated LAQs 2 x 10 5#  Seated marches 2 x 10 5#  Seated HS Curls x30 GTB    Neuromuscular re-education activities to improve: Balance for 0 minutes. The following activities were included:      therapeutic activities to improve functional performance for 13 minutes, including:  Standing marches on right 2 x 10  Standing hip adb on right  2 x 10   Standing hip extension on right 2 x 10  Step ups 2 x 10 on right with 3 #    gait training to improve functional mobility and safety for 0  minutes, including:    cold pack for 10 minutes to right hip.    Patient Education and Home Exercises       Education provided:   - continue POC    Written Home Exercises Provided: Yes. Exercises were reviewed and Emily was  able to demonstrate them prior to the end of the session.  Emily demonstrated good  understanding of the education provided. See Electronic Medical Record under Patient Instructions for exercises provided during therapy sessions    Assessment     Pt tolerated increase in exercise rep well.    Emily Is progressing well towards her goals.   Patient prognosis is Good.     Patient will continue to benefit from skilled outpatient physical therapy to address the deficits listed in the problem list box on initial evaluation, provide pt/family education and to maximize pt's level of independence in the home and community environment.     Patient's spiritual, cultural and educational needs considered and pt agreeable to plan of care and goals.     Anticipated barriers to physical therapy: none    Goals:   4 weeks   1. Independent with Home Exercise Program   2. Increase right Hip Range of Motion to 25 degrees for abduction and flexion to 110 AROM  3. Increase Right Hip Strength to grossly 4+/5  4. Patient will ambulate 500 feet with fluid step through gait pattern using front wheeled walker     Goals will be updated at time of UPOC    Plan     Continue with MCKINLEY Kessler, RICCI

## 2025-01-22 ENCOUNTER — CLINICAL SUPPORT (OUTPATIENT)
Dept: REHABILITATION | Facility: HOSPITAL | Age: 70
End: 2025-01-22
Payer: MEDICARE

## 2025-01-22 DIAGNOSIS — S72.8X1A OTHER FRACTURE OF RIGHT FEMUR, INITIAL ENCOUNTER FOR CLOSED FRACTURE: Primary | ICD-10-CM

## 2025-01-22 PROCEDURE — 97010 HOT OR COLD PACKS THERAPY: CPT | Mod: CQ

## 2025-01-22 PROCEDURE — 97530 THERAPEUTIC ACTIVITIES: CPT | Mod: CQ

## 2025-01-22 PROCEDURE — 97110 THERAPEUTIC EXERCISES: CPT | Mod: CQ

## 2025-01-22 NOTE — PROGRESS NOTES
[facility]   Physical Therapy Treatment Note      Name: Emily Ya  Clinic Number: [id number]    Therapy Diagnosis:   Encounter Diagnosis   Name Primary?    Other fracture of right femur, initial encounter for closed fracture Yes     Physician: Sukh Lei II*    Visit Date: 1/22/2025    Physician Orders: PT Eval and Treat   Medical Diagnosis from Referral: S72.141A (ICD-10-CM) - Displaced intertrochanteric fracture of right femur  Evaluation Date: 12/20/2024  Plan of Care Expiration: 1/17/2025  Progress Note Due: 1/17/2025  Date of Surgery: 12/13/2024  Visit # / Visits authorized: 7/ 12   FOTO: 26    Start Time: 1306  End Time: 1344  Total Time: 38 minutes  PTA Visit #: 2/5       Subjective     Patient reports: Pt reports that she is hurting a little bit worse today  She was compliant with home exercise program.  Response to previous treatment: good  Functional change: none    Pain: 7/10  Location: right upper legs     Objective      Objective Measures updated at progress report unless specified.     Treatment     Emily received the treatments listed below:      therapeutic exercises to develop strength and endurance for 15 minutes including:  Nustep x 6 minutes  Seated LAQs 3 x 10 5#  Seated marches 3 x 10 5#    Neuromuscular re-education activities to improve: Balance for 0 minutes. The following activities were included:      therapeutic activities to improve functional performance for 13 minutes, including:  Standing marches on right 3 x 10  Standing hip adb on right  3 x 10   Standing hip extension on right 3 x 10  Step ups 2 x 10     gait training to improve functional mobility and safety for 0  minutes, including:    cold pack for 10 minutes to right hip.    Patient Education and Home Exercises       Education provided:   - continue POC    Written Home Exercises Provided: Yes. Exercises were reviewed and Emily was able to demonstrate them prior to the end of  the session.  Emily demonstrated good  understanding of the education provided. See Electronic Medical Record under Patient Instructions for exercises provided during therapy sessions    Assessment     Pt tolerated increase in exercise rep well.    Emily Is progressing well towards her goals.   Patient prognosis is Good.     Patient will continue to benefit from skilled outpatient physical therapy to address the deficits listed in the problem list box on initial evaluation, provide pt/family education and to maximize pt's level of independence in the home and community environment.     Patient's spiritual, cultural and educational needs considered and pt agreeable to plan of care and goals.     Anticipated barriers to physical therapy: none    Goals:   4 weeks   1. Independent with Home Exercise Program   2. Increase right Hip Range of Motion to 25 degrees for abduction and flexion to 110 AROM  3. Increase Right Hip Strength to grossly 4+/5  4. Patient will ambulate 500 feet with fluid step through gait pattern using front wheeled walker     Goals will be updated at time of UPOC    Plan     Continue with MCKINLEY Kessler, RICCI

## 2025-01-23 ENCOUNTER — CLINICAL SUPPORT (OUTPATIENT)
Dept: REHABILITATION | Facility: HOSPITAL | Age: 70
End: 2025-01-23
Payer: MEDICARE

## 2025-01-23 DIAGNOSIS — S72.8X1A OTHER FRACTURE OF RIGHT FEMUR, INITIAL ENCOUNTER FOR CLOSED FRACTURE: Primary | ICD-10-CM

## 2025-01-23 PROCEDURE — 97110 THERAPEUTIC EXERCISES: CPT

## 2025-01-23 PROCEDURE — 97530 THERAPEUTIC ACTIVITIES: CPT

## 2025-01-23 NOTE — PROGRESS NOTES
[facility]   Physical Therapy Treatment Note      Name: Emily Ya  Clinic Number: [id number]    Therapy Diagnosis:   Encounter Diagnosis   Name Primary?    Other fracture of right femur, initial encounter for closed fracture Yes     Physician: Sukh Lei    Visit Date: 1/23/2025    Physician Orders: PT Eval and Treat   Medical Diagnosis from Referral: S72.141A (ICD-10-CM) - Displaced intertrochanteric fracture of right femur  Evaluation Date: 12/20/2024  Plan of Care Expiration: 1/17/2025  Progress Note Due: 1/17/2025  Date of Surgery: 12/13/2024  Visit # / Visits authorized: 8/ 12   FOTO: 26    Start Time: 1300  End Time: 1355  Total Time: 55 minutes  PTA Visit #: 0/5       Subjective     Patient reports: Pt reports that she is hurting a little bit worse today  She was compliant with home exercise program.  Response to previous treatment: good  Functional change: none    Pain: 4/10  Location: right upper legs     Objective      Objective Measures updated at progress report unless specified.     Treatment     Emily received the treatments listed below:      therapeutic exercises to develop strength and endurance for 15 minutes including:  Nustep x 6 minutes  Seated LAQs 3 x 10 5#  Seated marches 3 x 10 5#    Neuromuscular re-education activities to improve: Balance for 0 minutes. The following activities were included:      therapeutic activities to improve functional performance for 30 minutes, including:  Standing marches on right 3 x 10 1#  Standing hip adb on right  3 x 10   1#  Standing hip extension on right 3 x 10 1#  Standing knee flexion on right 3 x 10 1#  Supine hip abd with yellow t band 2 x10    gait training to improve functional mobility and safety for 0  minutes, including:    cold pack for 10 minutes to right hip.    Patient Education and Home Exercises       Education provided:   - continue POC    Written Home Exercises Provided: Yes. Exercises  were reviewed and Emily was able to demonstrate them prior to the end of the session.  Emily demonstrated good  understanding of the education provided. See Electronic Medical Record under Patient Instructions for exercises provided during therapy sessions    Assessment     Pt tolerated increase in exercise rep well.    Emily Is progressing well towards her goals.   Patient prognosis is Good.     Patient will continue to benefit from skilled outpatient physical therapy to address the deficits listed in the problem list box on initial evaluation, provide pt/family education and to maximize pt's level of independence in the home and community environment.     Patient's spiritual, cultural and educational needs considered and pt agreeable to plan of care and goals.     Anticipated barriers to physical therapy: none    Goals:   4 weeks   1. Independent with Home Exercise Program   2. Increase right Hip Range of Motion to 25 degrees for abduction and flexion to 110 AROM  3. Increase Right Hip Strength to grossly 4+/5  4. Patient will ambulate 500 feet with fluid step through gait pattern using front wheeled walker     Goals will be updated at time of UPOC    Plan     Continue with POC    Miles Harrington, PT

## 2025-01-24 ENCOUNTER — CLINICAL SUPPORT (OUTPATIENT)
Dept: REHABILITATION | Facility: HOSPITAL | Age: 70
End: 2025-01-24
Payer: MEDICARE

## 2025-01-24 DIAGNOSIS — S72.8X1A OTHER FRACTURE OF RIGHT FEMUR, INITIAL ENCOUNTER FOR CLOSED FRACTURE: Primary | ICD-10-CM

## 2025-01-24 PROCEDURE — 97110 THERAPEUTIC EXERCISES: CPT | Mod: CQ

## 2025-01-24 PROCEDURE — 97010 HOT OR COLD PACKS THERAPY: CPT | Mod: CQ

## 2025-01-24 PROCEDURE — 97530 THERAPEUTIC ACTIVITIES: CPT | Mod: CQ

## 2025-01-24 NOTE — PROGRESS NOTES
Outpatient Rehab    Physical Therapy Progress Note    Patient Name: Emily Ya  MRN: [id number]  YOB: 1955  Today's Date: 1/24/2025    Therapy Diagnosis:   Encounter Diagnosis   Name Primary?    Other fracture of right femur, initial encounter for closed fracture Yes     Physician: Sukh Lei II*    Physician Orders: Eval and Treat    Physician Orders: PT Eval and Treat   Medical Diagnosis from Referral: S72.141A (ICD-10-CM) - Displaced intertrochanteric fracture of right femur  Evaluation Date: 12/20/2024  Plan of Care Expiration: 1/17/2025  Progress Note Due: 1/17/2025  Date of Surgery: 12/13/2024  Visit # / Visits authorized: 8/ 12   FOTO: 26     Time In:1310  Time Out: 1348   Total time: 38 minutes       Subjective    Pt stated that her leg is sore down the side when she is not moving it does not hurt as bad  Pain     Patient reports a current pain level of 8/10.                 Past Medical History/Physical Systems Review:   Emily Ya  has a past medical history of GERD (gastroesophageal reflux disease), HLD (hyperlipidemia), Hypertension, and Meniere's disease, unspecified ear.    Emily Ya  has a past surgical history that includes Joint replacement; Back surgery; and Hysterectomy.    Emily has a current medication list which includes the following prescription(s): atorvastatin, cholecalciferol (vitamin d3), clopidogrel, famotidine, hydrocodone-acetaminophen, hydrocodone-acetaminophen, omeprazole, propranolol, and spiriva respimat.    Review of patient's allergies indicates:   Allergen Reactions    Sulfa (sulfonamide antibiotics)         Objective           Intake Outcome Measure for FOTO Survey    Therapist reviewed FOTO scores for Emily Ya on 1/24/2025.   FOTO report - see Media section or FOTO account episode details.     Intake Score:  %  Survey Score 1:  %  Survey Score 2:  %    Treatment:  15 minutes  Therapeutic Exercise  Therapeutic Exercise  Activity 1: nu step x 6 minutes  Therapeutic Exercise Activity 2: seated marches 3 x 10 with 5 lb on left  Therapeutic Exercise Activity 3: seated LAQs 3 x 10 with 5 lb on left  Therapeutic Exercise Activity 4: slant board 3 x 30 seconds  15 minutes  Therapeutic Activity  Therapeutic Activity 1: standing marches on right 3 x 10  Therapeutic Activity 2: standing hip abd on right 3 x 10  Therapeutic Activity 3: standing hip extension on right 3 x 10  Therapeutic Activity 4: step ups with right x 10    Modalities  Cryotherapy (Minutes\Location): 10 min to right hip    Patient's spiritual, cultural, and educational needs considered and patient agreeable to plan of care and goals.     Assessment & Plan    Pt tolerated exercises well   Continue with POC as indicated    Goals:   4 weeks   1. Independent with Home Exercise Program   2. Increase right Hip Range of Motion to 25 degrees for abduction and flexion to 110 AROM  3. Increase Right Hip Strength to grossly 4+/5  4. Patient will ambulate 500 feet with fluid step through gait pattern using front wheeled walker

## 2025-01-27 DIAGNOSIS — S72.141D DISPLACED INTERTROCHANTERIC FRACTURE OF RIGHT FEMUR, SUBSEQUENT ENCOUNTER FOR CLOSED FRACTURE WITH ROUTINE HEALING: Primary | ICD-10-CM

## 2025-01-27 DIAGNOSIS — M70.61 TROCHANTERIC BURSITIS OF RIGHT HIP: ICD-10-CM

## 2025-01-29 ENCOUNTER — CLINICAL SUPPORT (OUTPATIENT)
Dept: REHABILITATION | Facility: HOSPITAL | Age: 70
End: 2025-01-29
Payer: MEDICARE

## 2025-01-29 DIAGNOSIS — S72.8X1A OTHER FRACTURE OF RIGHT FEMUR, INITIAL ENCOUNTER FOR CLOSED FRACTURE: Primary | ICD-10-CM

## 2025-01-29 PROCEDURE — 97530 THERAPEUTIC ACTIVITIES: CPT | Mod: CQ

## 2025-01-29 PROCEDURE — 97110 THERAPEUTIC EXERCISES: CPT | Mod: CQ

## 2025-01-29 NOTE — PROGRESS NOTES
Outpatient Rehab    Physical Therapy Progress Note    Patient Name: Emily Ya  MRN: [id number]  YOB: 1955  Today's Date: 1/29/2025    Therapy Diagnosis:   Encounter Diagnosis   Name Primary?    Other fracture of right femur, initial encounter for closed fracture Yes     Physician: Sukh Lei II*    Physician Orders: PT Eval and Treat   Medical Diagnosis from Referral: S72.141A (ICD-10-CM) - Displaced intertrochanteric fracture of right femur  Evaluation Date: 12/20/2024  Plan of Care Expiration: 1/17/2025  Progress Note Due: 1/17/2025  Date of Surgery: 12/13/2024  Visit # / Visits authorized: 10/ 12   FOTO: 26     Time In: 1013   Time Out: 1045  Total Time: 32   Total Billable Time: 32 minutes         Subjective      Pain     Patient reports a current pain level of 6/10.                 Past Medical History/Physical Systems Review:   Emily Ya  has a past medical history of GERD (gastroesophageal reflux disease), HLD (hyperlipidemia), Hypertension, and Meniere's disease, unspecified ear.    Emily Ya  has a past surgical history that includes Joint replacement; Back surgery; and Hysterectomy.    Emily has a current medication list which includes the following prescription(s): atorvastatin, cholecalciferol (vitamin d3), clopidogrel, famotidine, hydrocodone-acetaminophen, hydrocodone-acetaminophen, omeprazole, propranolol, and spiriva respimat.    Review of patient's allergies indicates:   Allergen Reactions    Sulfa (sulfonamide antibiotics)         Objective           Intake Outcome Measure for FOTO Survey    Therapist reviewed FOTO scores for Emily Ya on 1/29/2025.   FOTO report - see Media section or FOTO account episode details.     Intake Score:  %  Survey Score 1:  %  Survey Score 2:  %    Treatment:  Therapeutic Exercise  Therapeutic Exercise Activity 1: nu step x 6 minutes, seated LACheng with 3# on left leg and 2# on right leg 2 x 10, seated marchmeagan  with 3# on left leg and 2# on right leg 2 x 10 (12 minutes)    Therapeutic Activity  Therapeutic Activity 1: standing marches 2 x 10, standing hip abd 2 x 10, standing hip extension 2 x 10 (bilateral), step ups x 10 on right, step ups lateral on right x 10, mini squats x 10 (10 minutes)    Modalities  Moist Heat (min): ice pack to right hip x 10 minutes (10 minutes)    Patient's spiritual, cultural, and educational needs considered and patient agreeable to plan of care and goals.     Assessment & Plan   Assessment: Pt tolerated weight bearing on the right lower extremity well. Pt had no pain with added exercsises.           Plan: continue with POC as indicated    Goals:   4 weeks   1. Independent with Home Exercise Program   2. Increase right Hip Range of Motion to 25 degrees for abduction and flexion to 110 AROM  3. Increase Right Hip Strength to grossly 4+/5  4. Patient will ambulate 500 feet with fluid step through gait pattern using front wheeled walker

## 2025-01-30 ENCOUNTER — CLINICAL SUPPORT (OUTPATIENT)
Dept: REHABILITATION | Facility: HOSPITAL | Age: 70
End: 2025-01-30
Payer: MEDICARE

## 2025-01-30 DIAGNOSIS — S72.8X1A OTHER FRACTURE OF RIGHT FEMUR, INITIAL ENCOUNTER FOR CLOSED FRACTURE: Primary | ICD-10-CM

## 2025-01-30 PROCEDURE — 97530 THERAPEUTIC ACTIVITIES: CPT

## 2025-01-30 PROCEDURE — 97110 THERAPEUTIC EXERCISES: CPT

## 2025-01-30 NOTE — PROGRESS NOTES
Outpatient Rehab    Physical Therapy Progress Note : Updated Plan of Care    Patient Name: Emily Ya  MRN: [id number]  YOB: 1955  Today's Date: 1/30/2025    Therapy Diagnosis:   Encounter Diagnosis   Name Primary?    Other fracture of right femur, initial encounter for closed fracture Yes     Physician: Sukh Lei II*    Physician Orders: Eval and Treat  Medical Diagnosis: R Femur fx  s/p ORIF    Visit # / Visits Authorized:  1 / 12   Date of Evaluation: 1/30/2025  Insurance Authorization Period: 1/30/2025 to 2/30/2025  Plan of Care Certification:  1/30/2025 to 2/30/2025      Time In:   1315   Time Out:  1410  Total Time:  55   Total Billable Time: 45         Subjective    Patient states her hip is getting stronger and he pain level is improving     Objective    Patient performed activities as stated below      Intake Outcome Measure for FOTO Survey    Therapist reviewed FOTO scores for Emily Ya on 1/30/2025.   FOTO report - see Media section or FOTO account episode details.     Intake Score:  %53    Treatment:  Therapeutic Exercise  Therapeutic Exercise Activity 1: 20 minutes: standing hip flexion, abd, extension,knee flexion 3 x10. HS, ITB and quadratus lumborum stretch 3 x30 sec    Therapeutic Activity  Therapeutic Activity 1: 25 minutes: Side stepping 4 x 8 feet, sit to stand 2 x 5 , weight shifting, cup walking forward and sideways    Modalities  Moist Heat (min): CP to R hip x 10 minutes    Patient's spiritual, cultural, and educational needs considered and patient agreeable to plan of care and goals.     Assessment & Plan    Patient continues to improve with mobility and strength. Patient is performing gait with more normal gait pattern    Goals:   STG  Patient will be independent with gait with SC x 150 feet on level surfaces in 2 weeks  LTG  Patient will demonstrate 4/5 hip ABD in 4 weeks  Patient will be independent with gait with SC on level and unlevel surfaces in  4 weeks  Patient will be able to walk up and lili 4 steps with cane and rail with no assistance

## 2025-02-03 ENCOUNTER — CLINICAL SUPPORT (OUTPATIENT)
Dept: REHABILITATION | Facility: HOSPITAL | Age: 70
End: 2025-02-03
Payer: MEDICARE

## 2025-02-03 DIAGNOSIS — M62.81 MUSCLE WEAKNESS: Primary | ICD-10-CM

## 2025-02-03 PROCEDURE — 97116 GAIT TRAINING THERAPY: CPT

## 2025-02-03 PROCEDURE — 97110 THERAPEUTIC EXERCISES: CPT

## 2025-02-03 PROCEDURE — 97530 THERAPEUTIC ACTIVITIES: CPT

## 2025-02-03 NOTE — PROGRESS NOTES
Outpatient Rehab    Physical Therapy Visit    Patient Name: Emily Ya  MRN: [id number]  YOB: 1955  Today's Date: 2/3/2025    Therapy Diagnosis:   Encounter Diagnosis   Name Primary?    Muscle weakness Yes     Physician: Mitchell Hsu PA-C    Medical Diagnosis: R hip fx s/p ORIF         Time In: 1409     Time Out: 1450   Total Time:   41  Total Billable Time: 41         Subjective   Patient states she is walking better and having less pain.         Objective   Treatment:  Therapeutic Exercise  Therapeutic Exercise Activity 1: Patient performed Nu step x 8 minutes.Standing hip flexion , abd, heel raises , squats 3 x  10    Therapeutic Activity  Therapeutic Activity 1: Patient performed side stepping , cup walking, weight shift , step ups in parallel bars with CGA    Gait Training  Gait Training Activity 1: Patient instructed in gait training with SBQC. Patient performed gait with SBQC 2 x 50 feet with CGA.    Patient's spiritual, cultural, and educational needs considered and patient agreeable to plan of care and goals.     Assessment & Plan   Assessment:   Patient was introduce to walking with SBQC. Patient has improved mobility with gait and RLE strength. Patient will benefit from continued PT for gait , balance , strength, and transfer training           Plan: Patient will benefit from continued PT for gait , balance , strength, and transfer camila Harrington, PT

## 2025-02-06 ENCOUNTER — CLINICAL SUPPORT (OUTPATIENT)
Dept: REHABILITATION | Facility: HOSPITAL | Age: 70
End: 2025-02-06
Payer: MEDICARE

## 2025-02-06 DIAGNOSIS — S72.8X1A OTHER FRACTURE OF RIGHT FEMUR, INITIAL ENCOUNTER FOR CLOSED FRACTURE: Primary | ICD-10-CM

## 2025-02-06 PROCEDURE — 97116 GAIT TRAINING THERAPY: CPT | Mod: CQ

## 2025-02-06 PROCEDURE — 97110 THERAPEUTIC EXERCISES: CPT | Mod: CQ

## 2025-02-06 PROCEDURE — 97010 HOT OR COLD PACKS THERAPY: CPT | Mod: CQ

## 2025-02-06 NOTE — PROGRESS NOTES
Outpatient Rehab    Physical Therapy Visit    Patient Name: Emily Ya  MRN: [id number]  YOB: 1955  Today's Date: 2/6/2025    Therapy Diagnosis:   Encounter Diagnosis   Name Primary?    Other fracture of right femur, initial encounter for closed fracture Yes     Physician: Sukh Lei II*    Physician Orders: Eval and Treat  Medical Diagnosis: right femur fx    Visit # / Visits Authorized:  2 / 12   PTA 1/5     Time In: 1309   Time Out: 1348  Total Time: 39   Total Billable Time: 39         Subjective   Pt states that her hip feels pretty good.         Objective            Treatment:  Therapeutic Exercise  Therapeutic Exercise Activity 1: nu step x 8 minutes at 1.6 resistance, standing marches, standing hip abd, standing hip extension 3 x 10 (15 minutes)    Gait Training  Gait Training Activity 1: side stepping x 4 x 8 feet, stepping over obstacles (styrofoam cups) forwards and sideways x 5, step ups forward x 10, step ups lateral x 10, gait training with SBQC (14 minutes)    Modalities  Cryotherapy (Minutes\Location): Cold Pack x 10 minutes on right hip (10 minutes)    Patient's spiritual, cultural, and educational needs considered and patient agreeable to plan of care and goals.     Assessment & Plan   Assessment: Pt tolerated tx well and is improving with gait.           Plan: continue with POC as indicated    Goals:     Indira Kessler PTA

## 2025-02-07 ENCOUNTER — CLINICAL SUPPORT (OUTPATIENT)
Dept: REHABILITATION | Facility: HOSPITAL | Age: 70
End: 2025-02-07
Payer: MEDICARE

## 2025-02-07 DIAGNOSIS — S72.8X1A OTHER FRACTURE OF RIGHT FEMUR, INITIAL ENCOUNTER FOR CLOSED FRACTURE: Primary | ICD-10-CM

## 2025-02-07 PROCEDURE — 97116 GAIT TRAINING THERAPY: CPT | Mod: CQ

## 2025-02-07 PROCEDURE — 97110 THERAPEUTIC EXERCISES: CPT | Mod: CQ

## 2025-02-07 NOTE — PROGRESS NOTES
Outpatient Rehab    Physical Therapy Visit    Patient Name: Emily Ya  MRN: [id number]  YOB: 1955  Today's Date: 2/7/2025    Therapy Diagnosis:   Encounter Diagnosis   Name Primary?    Other fracture of right femur, initial encounter for closed fracture Yes     Physician: Sukh Lei II*    Physician Orders: Eval and Treat  Medical Diagnosis: R femur fx    Visit # / Visits Authorized:  3 / 12   PTA 2/5  Time In: 1315   Time Out: 1345  Total Time: 30   Total Billable Time: 30 minutes         Subjective   pt states that her hip is not hurting today.  Pain reported as 0/10.      Objective            Treatment:  Therapeutic Exercise  Therapeutic Exercise Activity 1: nu step x 8 minutes at 1.6 resistance, standing marches, standing hip abd, standing hip extension 3 x 10 (15 minutes)    Gait Training  Gait Training Activity 1: side stepping x 4 x 8 feet, stepping over obstacles (styrofoam cups) forwards and sideways x 5, step ups forward x 10, step ups lateral x 10, gait training with SBQC on grass (15 minutes)    Patient's spiritual, cultural, and educational needs considered and patient agreeable to plan of care and goals.     Assessment & Plan   Assessment: Pt was able to ambulate on uneven ground and grass with SBQC           Plan: continue with POC as indicated    Goals:     Indira Kessler PTA    
English

## 2025-02-10 ENCOUNTER — CLINICAL SUPPORT (OUTPATIENT)
Dept: REHABILITATION | Facility: HOSPITAL | Age: 70
End: 2025-02-10
Payer: MEDICARE

## 2025-02-10 DIAGNOSIS — M70.61 TROCHANTERIC BURSITIS OF RIGHT HIP: ICD-10-CM

## 2025-02-10 DIAGNOSIS — S72.141D DISPLACED INTERTROCHANTERIC FRACTURE OF RIGHT FEMUR, SUBSEQUENT ENCOUNTER FOR CLOSED FRACTURE WITH ROUTINE HEALING: ICD-10-CM

## 2025-02-10 DIAGNOSIS — S72.8X1A OTHER FRACTURE OF RIGHT FEMUR, INITIAL ENCOUNTER FOR CLOSED FRACTURE: Primary | ICD-10-CM

## 2025-02-10 PROCEDURE — 97110 THERAPEUTIC EXERCISES: CPT | Mod: CQ

## 2025-02-10 PROCEDURE — 97116 GAIT TRAINING THERAPY: CPT | Mod: CQ

## 2025-02-10 NOTE — PROGRESS NOTES
Outpatient Rehab    Physical Therapy Visit    Patient Name: Emily Ya  MRN: [id number]  YOB: 1955  Today's Date: 2/10/2025    Therapy Diagnosis:   Encounter Diagnoses   Name Primary?    Displaced intertrochanteric fracture of right femur, subsequent encounter for closed fracture with routine healing     Trochanteric bursitis of right hip     Other fracture of right femur, initial encounter for closed fracture Yes     Physician: Sukh Lei II*    Physician Orders: Eval and Treat  Medical Diagnosis: R femur fx    Visit # / Visits Authorized:  4 / 12  PTA visit 3/5        Time In: 1315   Time Out: 1353  Total Time: 38   Total Billable Time: 38 minutes         Subjective   Pt states that her hip feels good today.  Pain reported as 0/10.      Objective            Treatment:  Therapeutic Exercise  Therapeutic Exercise Activity 1: nu step x 10 min at resistance of 1.7, standing marches, standing hip abd, standing hip extension 3 x 10 (23 minutes)    Gait Training  Gait Training Activity 1: side stepping x 5 x 8 feet, stepping over obstacles (styrofoam cups) forwards and sideways x 5, step ups forward x 10, step ups lateral x 10, gait training with SBQC on grass (15 minutes)    Patient's spiritual, cultural, and educational needs considered and patient agreeable to plan of care and goals.     Assessment & Plan   Assessment: Pt is able to ambulate on uneven ground with SBQC and is able to weight bear through the right lower extremity with no pain           Plan: continue with POC as indicated    Goals:     Indira Kessler PTA

## 2025-03-11 ENCOUNTER — DOCUMENTATION ONLY (OUTPATIENT)
Dept: REHABILITATION | Facility: HOSPITAL | Age: 70
End: 2025-03-11
Payer: MEDICARE

## 2025-03-11 NOTE — PROGRESS NOTES
Outpatient Rehab    Physical Therapy Discharge    Patient Name: Emily Ya  MRN: [id number]  YOB: 1955  Encounter Date: 3/11/2025    Therapy Diagnosis: No diagnosis found.  Physician: No ref. provider found    Physician Orders: Eval and Treat  Medical Diagnosis: Displaced intertrochanteric fracture of right femur     Date of Evaluation: 12/20/2024    FOTO:  Intake Score:  26%  Survey Score 1:  36%  Survey Score 2:  %         Subjective    Patient will be Discharged from PT at this time. Patient did not return to complete visits due to feeling as if she had met her goals for PT at this time.         Objective    Patient will be Discharged from PT at this time. Patient did not return to complete visits due to feeling as if she had met her goals for PT at this time.            Assessment & Plan   Assessment:     Patient will be Discharged from PT at this time. Patient did not return to complete visits due to feeling as if she had met her goals for PT at this time.         Patient's spiritual, cultural, and educational needs considered and patient agreeable to plan of care and goals.           Plan:  [facility] PT at this time     Miles Harrington, PT